# Patient Record
Sex: FEMALE | Race: WHITE | URBAN - METROPOLITAN AREA
[De-identification: names, ages, dates, MRNs, and addresses within clinical notes are randomized per-mention and may not be internally consistent; named-entity substitution may affect disease eponyms.]

---

## 2017-06-19 ENCOUNTER — OUTPATIENT (OUTPATIENT)
Dept: OUTPATIENT SERVICES | Facility: HOSPITAL | Age: 42
LOS: 1 days | End: 2017-06-19
Payer: COMMERCIAL

## 2017-06-19 DIAGNOSIS — O26.899 OTHER SPECIFIED PREGNANCY RELATED CONDITIONS, UNSPECIFIED TRIMESTER: ICD-10-CM

## 2017-06-19 DIAGNOSIS — Z3A.00 WEEKS OF GESTATION OF PREGNANCY NOT SPECIFIED: ICD-10-CM

## 2017-06-19 PROCEDURE — 99214 OFFICE O/P EST MOD 30 MIN: CPT

## 2017-06-21 DIAGNOSIS — O09.513 SUPERVISION OF ELDERLY PRIMIGRAVIDA, THIRD TRIMESTER: ICD-10-CM

## 2017-06-22 ENCOUNTER — OUTPATIENT (OUTPATIENT)
Dept: OUTPATIENT SERVICES | Facility: HOSPITAL | Age: 42
LOS: 1 days | End: 2017-06-22
Payer: COMMERCIAL

## 2017-06-22 DIAGNOSIS — Z01.818 ENCOUNTER FOR OTHER PREPROCEDURAL EXAMINATION: ICD-10-CM

## 2017-06-22 LAB
ANION GAP SERPL CALC-SCNC: 13 MMOL/L — SIGNIFICANT CHANGE UP (ref 5–17)
APTT BLD: 26.1 SEC — LOW (ref 27.5–37.4)
BLD GP AB SCN SERPL QL: NEGATIVE — SIGNIFICANT CHANGE UP
BLD GP AB SCN SERPL QL: NEGATIVE — SIGNIFICANT CHANGE UP
BUN SERPL-MCNC: 11 MG/DL — SIGNIFICANT CHANGE UP (ref 7–23)
CALCIUM SERPL-MCNC: 9.3 MG/DL — SIGNIFICANT CHANGE UP (ref 8.4–10.5)
CHLORIDE SERPL-SCNC: 100 MMOL/L — SIGNIFICANT CHANGE UP (ref 96–108)
CO2 SERPL-SCNC: 23 MMOL/L — SIGNIFICANT CHANGE UP (ref 22–31)
CREAT SERPL-MCNC: 0.6 MG/DL — SIGNIFICANT CHANGE UP (ref 0.5–1.3)
GLUCOSE SERPL-MCNC: 183 MG/DL — HIGH (ref 70–99)
HCT VFR BLD CALC: 38.9 % — SIGNIFICANT CHANGE UP (ref 34.5–45)
HGB BLD-MCNC: 13.1 G/DL — SIGNIFICANT CHANGE UP (ref 11.5–15.5)
INR BLD: 0.85 — LOW (ref 0.88–1.16)
MCHC RBC-ENTMCNC: 31.3 PG — SIGNIFICANT CHANGE UP (ref 27–34)
MCHC RBC-ENTMCNC: 33.7 G/DL — SIGNIFICANT CHANGE UP (ref 32–36)
MCV RBC AUTO: 93.1 FL — SIGNIFICANT CHANGE UP (ref 80–100)
PLATELET # BLD AUTO: 284 K/UL — SIGNIFICANT CHANGE UP (ref 150–400)
POTASSIUM SERPL-MCNC: 5 MMOL/L — SIGNIFICANT CHANGE UP (ref 3.5–5.3)
POTASSIUM SERPL-SCNC: 5 MMOL/L — SIGNIFICANT CHANGE UP (ref 3.5–5.3)
PROTHROM AB SERPL-ACNC: 9.4 SEC — LOW (ref 9.8–12.7)
RBC # BLD: 4.18 M/UL — SIGNIFICANT CHANGE UP (ref 3.8–5.2)
RBC # FLD: 13.5 % — SIGNIFICANT CHANGE UP (ref 10.3–16.9)
RH IG SCN BLD-IMP: POSITIVE — SIGNIFICANT CHANGE UP
RH IG SCN BLD-IMP: POSITIVE — SIGNIFICANT CHANGE UP
SODIUM SERPL-SCNC: 136 MMOL/L — SIGNIFICANT CHANGE UP (ref 135–145)
WBC # BLD: 14.4 K/UL — HIGH (ref 3.8–10.5)
WBC # FLD AUTO: 14.4 K/UL — HIGH (ref 3.8–10.5)

## 2017-06-22 PROCEDURE — 80048 BASIC METABOLIC PNL TOTAL CA: CPT

## 2017-06-22 PROCEDURE — 85730 THROMBOPLASTIN TIME PARTIAL: CPT

## 2017-06-22 PROCEDURE — 85027 COMPLETE CBC AUTOMATED: CPT

## 2017-06-22 PROCEDURE — 85610 PROTHROMBIN TIME: CPT

## 2017-06-22 PROCEDURE — 86901 BLOOD TYPING SEROLOGIC RH(D): CPT

## 2017-06-22 PROCEDURE — 86900 BLOOD TYPING SEROLOGIC ABO: CPT

## 2017-06-22 PROCEDURE — 86850 RBC ANTIBODY SCREEN: CPT

## 2017-06-27 ENCOUNTER — OUTPATIENT (OUTPATIENT)
Dept: OUTPATIENT SERVICES | Facility: HOSPITAL | Age: 42
LOS: 1 days | End: 2017-06-27
Payer: COMMERCIAL

## 2017-06-27 DIAGNOSIS — O26.899 OTHER SPECIFIED PREGNANCY RELATED CONDITIONS, UNSPECIFIED TRIMESTER: ICD-10-CM

## 2017-06-27 DIAGNOSIS — Z3A.00 WEEKS OF GESTATION OF PREGNANCY NOT SPECIFIED: ICD-10-CM

## 2017-06-27 PROCEDURE — 99214 OFFICE O/P EST MOD 30 MIN: CPT

## 2017-06-28 DIAGNOSIS — O09.513 SUPERVISION OF ELDERLY PRIMIGRAVIDA, THIRD TRIMESTER: ICD-10-CM

## 2017-06-29 ENCOUNTER — INPATIENT (INPATIENT)
Facility: HOSPITAL | Age: 42
LOS: 3 days | Discharge: HOME | End: 2017-07-03
Attending: SPECIALIST | Admitting: SPECIALIST
Payer: COMMERCIAL

## 2017-06-29 ENCOUNTER — RESULT REVIEW (OUTPATIENT)
Age: 42
End: 2017-06-29

## 2017-06-29 VITALS — HEIGHT: 64 IN | WEIGHT: 225.97 LBS

## 2017-06-29 LAB
BLD GP AB SCN SERPL QL: NEGATIVE — SIGNIFICANT CHANGE UP
RH IG SCN BLD-IMP: POSITIVE — SIGNIFICANT CHANGE UP
T PALLIDUM AB TITR SER: NEGATIVE — SIGNIFICANT CHANGE UP

## 2017-06-29 RX ORDER — OXYTOCIN 10 UNIT/ML
333.33 VIAL (ML) INJECTION
Qty: 20 | Refills: 0 | Status: DISCONTINUED | OUTPATIENT
Start: 2017-06-29 | End: 2017-07-03

## 2017-06-29 RX ORDER — METOCLOPRAMIDE HCL 10 MG
10 TABLET ORAL ONCE
Qty: 0 | Refills: 0 | Status: DISCONTINUED | OUTPATIENT
Start: 2017-06-29 | End: 2017-06-29

## 2017-06-29 RX ORDER — OXYTOCIN 10 UNIT/ML
41.67 VIAL (ML) INJECTION
Qty: 20 | Refills: 0 | Status: DISCONTINUED | OUTPATIENT
Start: 2017-06-29 | End: 2017-06-29

## 2017-06-29 RX ORDER — CEFAZOLIN SODIUM 1 G
2000 VIAL (EA) INJECTION ONCE
Qty: 0 | Refills: 0 | Status: COMPLETED | OUTPATIENT
Start: 2017-06-29 | End: 2017-06-29

## 2017-06-29 RX ORDER — DIPHENHYDRAMINE HCL 50 MG
25 CAPSULE ORAL EVERY 6 HOURS
Qty: 0 | Refills: 0 | Status: DISCONTINUED | OUTPATIENT
Start: 2017-06-29 | End: 2017-07-03

## 2017-06-29 RX ORDER — FERROUS SULFATE 325(65) MG
325 TABLET ORAL DAILY
Qty: 0 | Refills: 0 | Status: DISCONTINUED | OUTPATIENT
Start: 2017-06-29 | End: 2017-07-03

## 2017-06-29 RX ORDER — NALOXONE HYDROCHLORIDE 4 MG/.1ML
0.1 SPRAY NASAL
Qty: 0 | Refills: 0 | Status: DISCONTINUED | OUTPATIENT
Start: 2017-06-29 | End: 2017-06-29

## 2017-06-29 RX ORDER — ACETAMINOPHEN 500 MG
650 TABLET ORAL EVERY 6 HOURS
Qty: 0 | Refills: 0 | Status: DISCONTINUED | OUTPATIENT
Start: 2017-06-29 | End: 2017-07-03

## 2017-06-29 RX ORDER — IBUPROFEN 200 MG
600 TABLET ORAL EVERY 6 HOURS
Qty: 0 | Refills: 0 | Status: DISCONTINUED | OUTPATIENT
Start: 2017-06-29 | End: 2017-07-03

## 2017-06-29 RX ORDER — ONDANSETRON 8 MG/1
4 TABLET, FILM COATED ORAL EVERY 6 HOURS
Qty: 0 | Refills: 0 | Status: DISCONTINUED | OUTPATIENT
Start: 2017-06-29 | End: 2017-06-29

## 2017-06-29 RX ORDER — MORPHINE SULFATE 50 MG/1
0.3 CAPSULE, EXTENDED RELEASE ORAL ONCE
Qty: 0 | Refills: 0 | Status: DISCONTINUED | OUTPATIENT
Start: 2017-06-29 | End: 2017-06-29

## 2017-06-29 RX ORDER — LEVOTHYROXINE SODIUM 125 MCG
175 TABLET ORAL DAILY
Qty: 0 | Refills: 0 | Status: DISCONTINUED | OUTPATIENT
Start: 2017-06-30 | End: 2017-07-03

## 2017-06-29 RX ORDER — SIMETHICONE 80 MG/1
80 TABLET, CHEWABLE ORAL EVERY 4 HOURS
Qty: 0 | Refills: 0 | Status: DISCONTINUED | OUTPATIENT
Start: 2017-06-29 | End: 2017-07-03

## 2017-06-29 RX ORDER — HEPARIN SODIUM 5000 [USP'U]/ML
5000 INJECTION INTRAVENOUS; SUBCUTANEOUS EVERY 12 HOURS
Qty: 0 | Refills: 0 | Status: DISCONTINUED | OUTPATIENT
Start: 2017-06-30 | End: 2017-07-03

## 2017-06-29 RX ORDER — OXYTOCIN 10 UNIT/ML
41.67 VIAL (ML) INJECTION
Qty: 20 | Refills: 0 | Status: DISCONTINUED | OUTPATIENT
Start: 2017-06-29 | End: 2017-07-03

## 2017-06-29 RX ORDER — SODIUM CHLORIDE 9 MG/ML
1000 INJECTION, SOLUTION INTRAVENOUS ONCE
Qty: 0 | Refills: 0 | Status: COMPLETED | OUTPATIENT
Start: 2017-06-29 | End: 2017-06-29

## 2017-06-29 RX ORDER — CITRIC ACID/SODIUM CITRATE 300-500 MG
30 SOLUTION, ORAL ORAL ONCE
Qty: 0 | Refills: 0 | Status: COMPLETED | OUTPATIENT
Start: 2017-06-29 | End: 2017-06-29

## 2017-06-29 RX ORDER — SODIUM CHLORIDE 9 MG/ML
1000 INJECTION, SOLUTION INTRAVENOUS
Qty: 0 | Refills: 0 | Status: DISCONTINUED | OUTPATIENT
Start: 2017-06-29 | End: 2017-06-29

## 2017-06-29 RX ORDER — TETANUS TOXOID, REDUCED DIPHTHERIA TOXOID AND ACELLULAR PERTUSSIS VACCINE, ADSORBED 5; 2.5; 8; 8; 2.5 [IU]/.5ML; [IU]/.5ML; UG/.5ML; UG/.5ML; UG/.5ML
0.5 SUSPENSION INTRAMUSCULAR ONCE
Qty: 0 | Refills: 0 | Status: DISCONTINUED | OUTPATIENT
Start: 2017-06-29 | End: 2017-07-03

## 2017-06-29 RX ORDER — IBUPROFEN 200 MG
600 TABLET ORAL ONCE
Qty: 0 | Refills: 0 | Status: DISCONTINUED | OUTPATIENT
Start: 2017-06-29 | End: 2017-06-29

## 2017-06-29 RX ORDER — SODIUM CHLORIDE 9 MG/ML
1000 INJECTION, SOLUTION INTRAVENOUS
Qty: 0 | Refills: 0 | Status: DISCONTINUED | OUTPATIENT
Start: 2017-06-30 | End: 2017-07-03

## 2017-06-29 RX ORDER — LANOLIN
1 OINTMENT (GRAM) TOPICAL
Qty: 0 | Refills: 0 | Status: DISCONTINUED | OUTPATIENT
Start: 2017-06-29 | End: 2017-07-03

## 2017-06-29 RX ORDER — DOCUSATE SODIUM 100 MG
100 CAPSULE ORAL
Qty: 0 | Refills: 0 | Status: DISCONTINUED | OUTPATIENT
Start: 2017-06-29 | End: 2017-07-03

## 2017-06-29 RX ORDER — GLYCERIN ADULT
1 SUPPOSITORY, RECTAL RECTAL AT BEDTIME
Qty: 0 | Refills: 0 | Status: DISCONTINUED | OUTPATIENT
Start: 2017-06-29 | End: 2017-07-03

## 2017-06-29 RX ORDER — SODIUM CHLORIDE 9 MG/ML
1000 INJECTION, SOLUTION INTRAVENOUS
Qty: 0 | Refills: 0 | Status: DISCONTINUED | OUTPATIENT
Start: 2017-06-29 | End: 2017-06-30

## 2017-06-29 RX ADMIN — Medication 30 MILLILITER(S): at 13:12

## 2017-06-29 RX ADMIN — Medication 100 MILLIGRAM(S): at 13:13

## 2017-06-29 RX ADMIN — Medication 125 MILLIUNIT(S)/MIN: at 15:23

## 2017-06-29 RX ADMIN — SODIUM CHLORIDE 125 MILLILITER(S): 9 INJECTION, SOLUTION INTRAVENOUS at 10:45

## 2017-06-29 RX ADMIN — Medication 1000 MILLIUNIT(S)/MIN: at 15:22

## 2017-06-29 RX ADMIN — Medication 125 MILLIUNIT(S)/MIN: at 15:22

## 2017-06-29 RX ADMIN — SODIUM CHLORIDE 2000 MILLILITER(S): 9 INJECTION, SOLUTION INTRAVENOUS at 10:20

## 2017-06-29 RX ADMIN — Medication 202 MILLIGRAM(S): at 17:10

## 2017-06-30 LAB
HCT VFR BLD CALC: 32.6 % — LOW (ref 34.5–45)
HGB BLD-MCNC: 11 G/DL — LOW (ref 11.5–15.5)
MCHC RBC-ENTMCNC: 31.1 PG — SIGNIFICANT CHANGE UP (ref 27–34)
MCHC RBC-ENTMCNC: 33.7 G/DL — SIGNIFICANT CHANGE UP (ref 32–36)
MCV RBC AUTO: 92.1 FL — SIGNIFICANT CHANGE UP (ref 80–100)
PLATELET # BLD AUTO: 247 K/UL — SIGNIFICANT CHANGE UP (ref 150–400)
RBC # BLD: 3.54 M/UL — LOW (ref 3.8–5.2)
RBC # FLD: 13.6 % — SIGNIFICANT CHANGE UP (ref 10.3–16.9)
WBC # BLD: 13.4 K/UL — HIGH (ref 3.8–10.5)
WBC # FLD AUTO: 13.4 K/UL — HIGH (ref 3.8–10.5)

## 2017-06-30 RX ADMIN — Medication 600 MILLIGRAM(S): at 06:30

## 2017-06-30 RX ADMIN — Medication 600 MILLIGRAM(S): at 18:36

## 2017-06-30 RX ADMIN — HEPARIN SODIUM 5000 UNIT(S): 5000 INJECTION INTRAVENOUS; SUBCUTANEOUS at 22:59

## 2017-06-30 RX ADMIN — Medication 325 MILLIGRAM(S): at 12:38

## 2017-06-30 RX ADMIN — Medication 100 MILLIGRAM(S): at 07:00

## 2017-06-30 RX ADMIN — Medication 600 MILLIGRAM(S): at 12:38

## 2017-06-30 RX ADMIN — Medication 1 TABLET(S): at 12:38

## 2017-06-30 RX ADMIN — Medication 650 MILLIGRAM(S): at 02:30

## 2017-06-30 RX ADMIN — Medication 600 MILLIGRAM(S): at 07:19

## 2017-06-30 RX ADMIN — Medication 100 MILLIGRAM(S): at 18:39

## 2017-06-30 RX ADMIN — SIMETHICONE 80 MILLIGRAM(S): 80 TABLET, CHEWABLE ORAL at 07:00

## 2017-06-30 NOTE — PROGRESS NOTE ADULT - ASSESSMENT
A/P  yo 42y s/p c/s, POD # 1 ,stable  Diet: Reg  Pain: OPM  IV fluid: Saline lock  OOB/SCDs/IS  Continue to monitor  Anticipate discharge POD #3 or #4

## 2017-06-30 NOTE — PROGRESS NOTE ADULT - SUBJECTIVE AND OBJECTIVE BOX
Patient evaluated at bedside.   She reports pain is well controlled   She denies headache, dizziness, chest pain, palpitations, shortness of breathe, nausea, vomiting or heavy vaginal bleeding.  She has not been ambulating  or voiding spontaneously with SCDs on and poe in. She has not passed gas but is tolerating clear diet and is breastfeeding.    Physical Exam:  Vital Signs Last 24 Hrs  T(C): 36.7 (30 Jun 2017 02:00), Max: 36.7 (30 Jun 2017 02:00)  T(F): 98.1 (30 Jun 2017 02:00), Max: 98.1 (30 Jun 2017 02:00)  HR: 70 (30 Jun 2017 02:00) (58 - 77)  BP: 93/58 (30 Jun 2017 02:00) (93/58 - 125/59)  BP(mean): --  RR: 18 (30 Jun 2017 02:00) (16 - 20)  SpO2: 98% (30 Jun 2017 02:00) (95% - 100%)    GA: NAD, A+0 x 3  CV: RRR  Pulm: CTAB  Breasts: soft, nontender, no palpable masses  Abd: + BS, soft, nontender, nondistended, no rebound or guarding, pressure dressing clean, dry and intact, uterus firm at midline at umbilicus  : lochia WNL  Poe: insitu, clear  Extremities: no swelling or calf tenderness, reflexes +2 bilaterally

## 2017-07-01 RX ORDER — IBUPROFEN 200 MG
600 TABLET ORAL EVERY 6 HOURS
Qty: 0 | Refills: 0 | Status: DISCONTINUED | OUTPATIENT
Start: 2017-07-01 | End: 2017-07-03

## 2017-07-01 RX ADMIN — Medication 100 MILLIGRAM(S): at 07:06

## 2017-07-01 RX ADMIN — Medication 600 MILLIGRAM(S): at 08:08

## 2017-07-01 RX ADMIN — Medication 1 TABLET(S): at 12:34

## 2017-07-01 RX ADMIN — Medication 600 MILLIGRAM(S): at 13:15

## 2017-07-01 RX ADMIN — Medication 600 MILLIGRAM(S): at 17:20

## 2017-07-01 RX ADMIN — Medication 600 MILLIGRAM(S): at 18:39

## 2017-07-01 RX ADMIN — Medication 175 MICROGRAM(S): at 07:03

## 2017-07-01 RX ADMIN — Medication 100 MILLIGRAM(S): at 12:32

## 2017-07-01 RX ADMIN — Medication 600 MILLIGRAM(S): at 07:20

## 2017-07-01 RX ADMIN — Medication 600 MILLIGRAM(S): at 12:29

## 2017-07-01 RX ADMIN — HEPARIN SODIUM 5000 UNIT(S): 5000 INJECTION INTRAVENOUS; SUBCUTANEOUS at 12:33

## 2017-07-01 RX ADMIN — Medication 325 MILLIGRAM(S): at 12:32

## 2017-07-01 NOTE — PROGRESS NOTE ADULT - ASSESSMENT
A/P  42y  s/p , POD #2, stable  - Pain: Motrin or Percocet prn  - GI: Reg diet  - : Voiding  - DVT prophylaxis: SQH 5000u q12  - Dispo: POD3 or 4

## 2017-07-01 NOTE — PROGRESS NOTE ADULT - SUBJECTIVE AND OBJECTIVE BOX
Patient evaluated at bedside.   She reports pain is well controlled.  She denies headache, dizziness, chest pain, palpitations, shortness of breathe, nausea, vomiting or heavy vaginal bleeding.  She has been ambulating without assistance, voiding spontaneously, passing gas, tolerating regular diet and is breastfeeding.    Physical Exam:  Vital Signs Last 24 Hrs  T(C): 36.6 (01 Jul 2017 06:22), Max: 37 (30 Jun 2017 14:00)  T(F): 97.9 (01 Jul 2017 06:22), Max: 98.6 (30 Jun 2017 14:00)  HR: 73 (01 Jul 2017 06:22) (72 - 82)  BP: 105/73 (01 Jul 2017 06:22) (88/57 - 105/73)  BP(mean): --  RR: 18 (01 Jul 2017 06:22) (18 - 18)  SpO2: 96% (01 Jul 2017 06:22) (96% - 100%)    06-30 @ 07:01  -  07-01 @ 07:00  --------------------------------------------------------  IN: 0 mL / OUT: 1000 mL / NET: -1000 mL        GA: NAD, A+0 x 3  CV: RRR  Pulm: Normal work of breathing  Breasts: soft, nontender, no palpable masses  Abd: + BS, soft, nontender, nondistended, no rebound or guarding, uterus firm at midline, 3 fb below umbilicus  Incision: well approximated, no erythema or discharge  : lochia WNL  Extremities: no swelling or calf tenderness                            11.0   13.4  )-----------( 247      ( 30 Jun 2017 11:03 )             32.6

## 2017-07-02 RX ADMIN — HEPARIN SODIUM 5000 UNIT(S): 5000 INJECTION INTRAVENOUS; SUBCUTANEOUS at 00:11

## 2017-07-02 RX ADMIN — Medication 600 MILLIGRAM(S): at 18:09

## 2017-07-02 RX ADMIN — Medication 600 MILLIGRAM(S): at 00:11

## 2017-07-02 RX ADMIN — Medication 600 MILLIGRAM(S): at 06:26

## 2017-07-02 RX ADMIN — Medication 200 MILLIGRAM(S): at 12:34

## 2017-07-02 RX ADMIN — HEPARIN SODIUM 5000 UNIT(S): 5000 INJECTION INTRAVENOUS; SUBCUTANEOUS at 14:47

## 2017-07-02 RX ADMIN — Medication 100 MILLIGRAM(S): at 07:58

## 2017-07-02 RX ADMIN — Medication 175 MICROGRAM(S): at 06:29

## 2017-07-02 RX ADMIN — Medication 1 TABLET(S): at 12:59

## 2017-07-02 RX ADMIN — Medication 600 MILLIGRAM(S): at 18:24

## 2017-07-02 RX ADMIN — Medication 600 MILLIGRAM(S): at 07:15

## 2017-07-02 RX ADMIN — Medication 325 MILLIGRAM(S): at 12:58

## 2017-07-02 RX ADMIN — Medication 600 MILLIGRAM(S): at 01:05

## 2017-07-02 NOTE — PROGRESS NOTE ADULT - SUBJECTIVE AND OBJECTIVE BOX
Patient evaluated at bedside.   She reports pain is well controlled.  She denies heavy vaginal bleeding.  She has been ambulating without assistance, voiding spontaneously, passing gas, tolerating regular diet and is breastfeeding.    Physical Exam:  Vital Signs Last 24 Hrs  T(C): 36.3 (01 Jul 2017 22:04), Max: 36.6 (01 Jul 2017 06:22)  T(F): 97.4 (01 Jul 2017 22:04), Max: 97.9 (01 Jul 2017 06:22)  HR: 73 (01 Jul 2017 22:04) (73 - 80)  BP: 101/50 (01 Jul 2017 22:04) (100/53 - 105/73)  BP(mean): --  RR: 18 (01 Jul 2017 22:04) (18 - 18)  SpO2: 98% (01 Jul 2017 22:04) (96% - 98%)    GA: NAD, A+0 x 3  Abd: soft, nontender, nondistended, no rebound or guarding, incision clean, dry and intact, uterus firm at midline,  below the umbilicus  : lochia WNL  Extremities: no calf tenderness                            11.0   13.4  )-----------( 247      ( 30 Jun 2017 11:03 )             32.6

## 2017-07-02 NOTE — PROGRESS NOTE ADULT - ASSESSMENT
A/P  41yo  s/p c/s, POD #3  ,stable  1. Pain: well controlled on PO pain meds  2. GI: regular diet  3. : voiding  4. DVT prophylaxis: SQH, ambulation  5. Dispo: POD 3 or 4

## 2017-07-03 ENCOUNTER — TRANSCRIPTION ENCOUNTER (OUTPATIENT)
Age: 42
End: 2017-07-03

## 2017-07-03 VITALS
HEART RATE: 81 BPM | RESPIRATION RATE: 17 BRPM | TEMPERATURE: 98 F | SYSTOLIC BLOOD PRESSURE: 101 MMHG | OXYGEN SATURATION: 99 % | DIASTOLIC BLOOD PRESSURE: 69 MMHG

## 2017-07-03 PROCEDURE — 86780 TREPONEMA PALLIDUM: CPT

## 2017-07-03 PROCEDURE — 86900 BLOOD TYPING SEROLOGIC ABO: CPT

## 2017-07-03 PROCEDURE — 85027 COMPLETE CBC AUTOMATED: CPT

## 2017-07-03 PROCEDURE — 86850 RBC ANTIBODY SCREEN: CPT

## 2017-07-03 PROCEDURE — 36415 COLL VENOUS BLD VENIPUNCTURE: CPT

## 2017-07-03 PROCEDURE — 86901 BLOOD TYPING SEROLOGIC RH(D): CPT

## 2017-07-03 PROCEDURE — 88307 TISSUE EXAM BY PATHOLOGIST: CPT

## 2017-07-03 RX ORDER — LEVOTHYROXINE SODIUM 125 MCG
1 TABLET ORAL
Qty: 0 | Refills: 0 | COMMUNITY

## 2017-07-03 RX ADMIN — Medication 175 MICROGRAM(S): at 06:10

## 2017-07-03 RX ADMIN — Medication 1 TABLET(S): at 12:32

## 2017-07-03 RX ADMIN — Medication 600 MILLIGRAM(S): at 13:03

## 2017-07-03 RX ADMIN — Medication 600 MILLIGRAM(S): at 07:00

## 2017-07-03 RX ADMIN — Medication 600 MILLIGRAM(S): at 00:07

## 2017-07-03 RX ADMIN — Medication 600 MILLIGRAM(S): at 12:32

## 2017-07-03 RX ADMIN — HEPARIN SODIUM 5000 UNIT(S): 5000 INJECTION INTRAVENOUS; SUBCUTANEOUS at 00:07

## 2017-07-03 RX ADMIN — Medication 600 MILLIGRAM(S): at 06:10

## 2017-07-03 RX ADMIN — Medication 600 MILLIGRAM(S): at 01:00

## 2017-07-03 RX ADMIN — Medication 325 MILLIGRAM(S): at 12:32

## 2017-07-03 NOTE — DISCHARGE NOTE OB - PLAN OF CARE
Uncomplicated postpartum recovery. Follow up with Dr. Peña in 2 weeks. Nothing per vagina x2 weeks.  Showers only.  Normal activity, restrict heavy lifting and strenuous activity.  Call or go to emergency room if T >101 F, extreme onset pain or bleeding or foul smelling vaginal discharge.

## 2017-07-03 NOTE — DISCHARGE NOTE OB - CARE PROVIDER_API CALL
Chris Peña), Obstetrics and Gynecology  [address] [place] [id number]  Phone: [phone]  Fax: [phone]

## 2017-07-03 NOTE — DISCHARGE NOTE OB - CARE PLAN
Principal Discharge DX:	 delivery delivered  Goal:	Uncomplicated postpartum recovery.  Instructions for follow-up, activity and diet:	Follow up with Dr. Peña in 2 weeks. Nothing per vagina x2 weeks.  Showers only.  Normal activity, restrict heavy lifting and strenuous activity.  Call or go to emergency room if T >101 F, extreme onset pain or bleeding or foul smelling vaginal discharge.

## 2017-07-06 DIAGNOSIS — E03.9 HYPOTHYROIDISM, UNSPECIFIED: ICD-10-CM

## 2017-07-06 DIAGNOSIS — O09.513 SUPERVISION OF ELDERLY PRIMIGRAVIDA, THIRD TRIMESTER: ICD-10-CM

## 2017-07-06 DIAGNOSIS — O36.5930 MATERNAL CARE FOR OTHER KNOWN OR SUSPECTED POOR FETAL GROWTH, THIRD TRIMESTER, NOT APPLICABLE OR UNSPECIFIED: ICD-10-CM

## 2017-07-06 DIAGNOSIS — Z34.03 ENCOUNTER FOR SUPERVISION OF NORMAL FIRST PREGNANCY, THIRD TRIMESTER: ICD-10-CM

## 2017-07-06 DIAGNOSIS — Z98.84 BARIATRIC SURGERY STATUS: ICD-10-CM

## 2017-07-06 DIAGNOSIS — Z3A.37 37 WEEKS GESTATION OF PREGNANCY: ICD-10-CM

## 2017-07-06 LAB — SURGICAL PATHOLOGY STUDY: SIGNIFICANT CHANGE UP

## 2018-05-09 NOTE — DISCHARGE NOTE OB - USE THE 5 A'S (ASK, ADVISE, ASSESS, ASSIST, ARRANGE)
Statement Selected
monday wed, friday  pt did not provide info regarding renal Dr and last HD day.  renal diet

## 2018-06-07 ENCOUNTER — OUTPATIENT (OUTPATIENT)
Dept: OUTPATIENT SERVICES | Facility: HOSPITAL | Age: 43
LOS: 1 days | End: 2018-06-07
Payer: COMMERCIAL

## 2018-06-07 DIAGNOSIS — Z3A.00 WEEKS OF GESTATION OF PREGNANCY NOT SPECIFIED: ICD-10-CM

## 2018-06-07 DIAGNOSIS — O26.899 OTHER SPECIFIED PREGNANCY RELATED CONDITIONS, UNSPECIFIED TRIMESTER: ICD-10-CM

## 2018-06-07 PROCEDURE — 99214 OFFICE O/P EST MOD 30 MIN: CPT

## 2018-06-07 PROCEDURE — 76816 OB US FOLLOW-UP PER FETUS: CPT

## 2018-06-07 PROCEDURE — 76817 TRANSVAGINAL US OBSTETRIC: CPT

## 2018-06-08 DIAGNOSIS — O09.522 SUPERVISION OF ELDERLY MULTIGRAVIDA, SECOND TRIMESTER: ICD-10-CM

## 2018-06-20 ENCOUNTER — TRANSCRIPTION ENCOUNTER (OUTPATIENT)
Age: 43
End: 2018-06-20

## 2018-09-24 ENCOUNTER — OUTPATIENT (OUTPATIENT)
Dept: OUTPATIENT SERVICES | Facility: HOSPITAL | Age: 43
LOS: 1 days | End: 2018-09-24
Payer: COMMERCIAL

## 2018-09-24 DIAGNOSIS — Z01.818 ENCOUNTER FOR OTHER PREPROCEDURAL EXAMINATION: ICD-10-CM

## 2018-09-24 LAB
ANION GAP SERPL CALC-SCNC: 13 MMOL/L — SIGNIFICANT CHANGE UP (ref 5–17)
APTT BLD: 26.5 SEC — LOW (ref 27.5–37.4)
BLD GP AB SCN SERPL QL: NEGATIVE — SIGNIFICANT CHANGE UP
BUN SERPL-MCNC: 10 MG/DL — SIGNIFICANT CHANGE UP (ref 7–23)
CALCIUM SERPL-MCNC: 9.3 MG/DL — SIGNIFICANT CHANGE UP (ref 8.4–10.5)
CHLORIDE SERPL-SCNC: 102 MMOL/L — SIGNIFICANT CHANGE UP (ref 96–108)
CO2 SERPL-SCNC: 23 MMOL/L — SIGNIFICANT CHANGE UP (ref 22–31)
CREAT SERPL-MCNC: 0.59 MG/DL — SIGNIFICANT CHANGE UP (ref 0.5–1.3)
GLUCOSE SERPL-MCNC: 157 MG/DL — HIGH (ref 70–99)
HCT VFR BLD CALC: 37.6 % — SIGNIFICANT CHANGE UP (ref 34.5–45)
HGB BLD-MCNC: 11.9 G/DL — SIGNIFICANT CHANGE UP (ref 11.5–15.5)
INR BLD: 0.88 — SIGNIFICANT CHANGE UP (ref 0.88–1.16)
MCHC RBC-ENTMCNC: 29.4 PG — SIGNIFICANT CHANGE UP (ref 27–34)
MCHC RBC-ENTMCNC: 31.6 G/DL — LOW (ref 32–36)
MCV RBC AUTO: 92.8 FL — SIGNIFICANT CHANGE UP (ref 80–100)
PLATELET # BLD AUTO: 273 K/UL — SIGNIFICANT CHANGE UP (ref 150–400)
POTASSIUM SERPL-MCNC: 5.4 MMOL/L — HIGH (ref 3.5–5.3)
POTASSIUM SERPL-SCNC: 5.4 MMOL/L — HIGH (ref 3.5–5.3)
PROTHROM AB SERPL-ACNC: 9.7 SEC — LOW (ref 9.8–12.7)
RBC # BLD: 4.05 M/UL — SIGNIFICANT CHANGE UP (ref 3.8–5.2)
RBC # FLD: 14.3 % — SIGNIFICANT CHANGE UP (ref 10.3–16.9)
RH IG SCN BLD-IMP: POSITIVE — SIGNIFICANT CHANGE UP
SODIUM SERPL-SCNC: 138 MMOL/L — SIGNIFICANT CHANGE UP (ref 135–145)
WBC # BLD: 10.5 K/UL — SIGNIFICANT CHANGE UP (ref 3.8–10.5)
WBC # FLD AUTO: 10.5 K/UL — SIGNIFICANT CHANGE UP (ref 3.8–10.5)

## 2018-09-24 PROCEDURE — 85027 COMPLETE CBC AUTOMATED: CPT

## 2018-09-24 PROCEDURE — 85610 PROTHROMBIN TIME: CPT

## 2018-09-24 PROCEDURE — 86901 BLOOD TYPING SEROLOGIC RH(D): CPT

## 2018-09-24 PROCEDURE — 80048 BASIC METABOLIC PNL TOTAL CA: CPT

## 2018-09-24 PROCEDURE — 86850 RBC ANTIBODY SCREEN: CPT

## 2018-09-24 PROCEDURE — 85730 THROMBOPLASTIN TIME PARTIAL: CPT

## 2018-09-24 PROCEDURE — 86900 BLOOD TYPING SEROLOGIC ABO: CPT

## 2018-10-02 ENCOUNTER — INPATIENT (INPATIENT)
Facility: HOSPITAL | Age: 43
LOS: 2 days | Discharge: HOME | End: 2018-10-05
Attending: SPECIALIST | Admitting: SPECIALIST
Payer: COMMERCIAL

## 2018-10-02 VITALS — HEIGHT: 64 IN | WEIGHT: 222.67 LBS

## 2018-10-02 LAB
BLD GP AB SCN SERPL QL: NEGATIVE — SIGNIFICANT CHANGE UP
HCT VFR BLD CALC: 35 % — SIGNIFICANT CHANGE UP (ref 34.5–45)
HGB BLD-MCNC: 11.1 G/DL — LOW (ref 11.5–15.5)
MCHC RBC-ENTMCNC: 28.7 PG — SIGNIFICANT CHANGE UP (ref 27–34)
MCHC RBC-ENTMCNC: 31.7 G/DL — LOW (ref 32–36)
MCV RBC AUTO: 90.4 FL — SIGNIFICANT CHANGE UP (ref 80–100)
PLATELET # BLD AUTO: 259 K/UL — SIGNIFICANT CHANGE UP (ref 150–400)
RBC # BLD: 3.87 M/UL — SIGNIFICANT CHANGE UP (ref 3.8–5.2)
RBC # FLD: 14.7 % — SIGNIFICANT CHANGE UP (ref 10.3–16.9)
RH IG SCN BLD-IMP: POSITIVE — SIGNIFICANT CHANGE UP
WBC # BLD: 9.8 K/UL — SIGNIFICANT CHANGE UP (ref 3.8–10.5)
WBC # FLD AUTO: 9.8 K/UL — SIGNIFICANT CHANGE UP (ref 3.8–10.5)

## 2018-10-02 RX ORDER — IBUPROFEN 200 MG
600 TABLET ORAL EVERY 6 HOURS
Qty: 0 | Refills: 0 | Status: DISCONTINUED | OUTPATIENT
Start: 2018-10-02 | End: 2018-10-05

## 2018-10-02 RX ORDER — SODIUM CHLORIDE 9 MG/ML
1000 INJECTION, SOLUTION INTRAVENOUS ONCE
Qty: 0 | Refills: 0 | Status: COMPLETED | OUTPATIENT
Start: 2018-10-02 | End: 2018-10-02

## 2018-10-02 RX ORDER — TETANUS TOXOID, REDUCED DIPHTHERIA TOXOID AND ACELLULAR PERTUSSIS VACCINE, ADSORBED 5; 2.5; 8; 8; 2.5 [IU]/.5ML; [IU]/.5ML; UG/.5ML; UG/.5ML; UG/.5ML
0.5 SUSPENSION INTRAMUSCULAR ONCE
Qty: 0 | Refills: 0 | Status: DISCONTINUED | OUTPATIENT
Start: 2018-10-02 | End: 2018-10-05

## 2018-10-02 RX ORDER — OXYCODONE AND ACETAMINOPHEN 5; 325 MG/1; MG/1
2 TABLET ORAL EVERY 6 HOURS
Qty: 0 | Refills: 0 | Status: DISCONTINUED | OUTPATIENT
Start: 2018-10-02 | End: 2018-10-05

## 2018-10-02 RX ORDER — SODIUM CHLORIDE 9 MG/ML
1000 INJECTION, SOLUTION INTRAVENOUS
Qty: 0 | Refills: 0 | Status: DISCONTINUED | OUTPATIENT
Start: 2018-10-02 | End: 2018-10-05

## 2018-10-02 RX ORDER — SIMETHICONE 80 MG/1
80 TABLET, CHEWABLE ORAL EVERY 4 HOURS
Qty: 0 | Refills: 0 | Status: DISCONTINUED | OUTPATIENT
Start: 2018-10-02 | End: 2018-10-05

## 2018-10-02 RX ORDER — CITRIC ACID/SODIUM CITRATE 300-500 MG
30 SOLUTION, ORAL ORAL ONCE
Qty: 0 | Refills: 0 | Status: COMPLETED | OUTPATIENT
Start: 2018-10-02 | End: 2018-10-02

## 2018-10-02 RX ORDER — CEFAZOLIN SODIUM 1 G
2000 VIAL (EA) INJECTION ONCE
Qty: 0 | Refills: 0 | Status: COMPLETED | OUTPATIENT
Start: 2018-10-02 | End: 2018-10-02

## 2018-10-02 RX ORDER — HEPARIN SODIUM 5000 [USP'U]/ML
5000 INJECTION INTRAVENOUS; SUBCUTANEOUS EVERY 12 HOURS
Qty: 0 | Refills: 0 | Status: DISCONTINUED | OUTPATIENT
Start: 2018-10-03 | End: 2018-10-05

## 2018-10-02 RX ORDER — LANOLIN
1 OINTMENT (GRAM) TOPICAL
Qty: 0 | Refills: 0 | Status: DISCONTINUED | OUTPATIENT
Start: 2018-10-02 | End: 2018-10-05

## 2018-10-02 RX ORDER — LEVOTHYROXINE SODIUM 125 MCG
75 TABLET ORAL DAILY
Qty: 0 | Refills: 0 | Status: DISCONTINUED | OUTPATIENT
Start: 2018-10-02 | End: 2018-10-05

## 2018-10-02 RX ORDER — HYDROMORPHONE HYDROCHLORIDE 2 MG/ML
0.5 INJECTION INTRAMUSCULAR; INTRAVENOUS; SUBCUTANEOUS
Qty: 0 | Refills: 0 | Status: DISCONTINUED | OUTPATIENT
Start: 2018-10-02 | End: 2018-10-05

## 2018-10-02 RX ORDER — OXYTOCIN 10 UNIT/ML
41.67 VIAL (ML) INJECTION
Qty: 20 | Refills: 0 | Status: DISCONTINUED | OUTPATIENT
Start: 2018-10-02 | End: 2018-10-05

## 2018-10-02 RX ORDER — DIPHENHYDRAMINE HCL 50 MG
25 CAPSULE ORAL EVERY 6 HOURS
Qty: 0 | Refills: 0 | Status: DISCONTINUED | OUTPATIENT
Start: 2018-10-02 | End: 2018-10-05

## 2018-10-02 RX ORDER — DOCUSATE SODIUM 100 MG
100 CAPSULE ORAL
Qty: 0 | Refills: 0 | Status: DISCONTINUED | OUTPATIENT
Start: 2018-10-02 | End: 2018-10-05

## 2018-10-02 RX ORDER — ACETAMINOPHEN 500 MG
650 TABLET ORAL EVERY 6 HOURS
Qty: 0 | Refills: 0 | Status: DISCONTINUED | OUTPATIENT
Start: 2018-10-02 | End: 2018-10-05

## 2018-10-02 RX ORDER — ONDANSETRON 8 MG/1
4 TABLET, FILM COATED ORAL EVERY 6 HOURS
Qty: 0 | Refills: 0 | Status: DISCONTINUED | OUTPATIENT
Start: 2018-10-02 | End: 2018-10-05

## 2018-10-02 RX ORDER — GLYCERIN ADULT
1 SUPPOSITORY, RECTAL RECTAL AT BEDTIME
Qty: 0 | Refills: 0 | Status: DISCONTINUED | OUTPATIENT
Start: 2018-10-02 | End: 2018-10-05

## 2018-10-02 RX ORDER — SCOPALAMINE 1 MG/3D
1 PATCH, EXTENDED RELEASE TRANSDERMAL ONCE
Qty: 0 | Refills: 0 | Status: COMPLETED | OUTPATIENT
Start: 2018-10-02 | End: 2018-10-02

## 2018-10-02 RX ORDER — SODIUM CHLORIDE 9 MG/ML
1000 INJECTION, SOLUTION INTRAVENOUS
Qty: 0 | Refills: 0 | Status: DISCONTINUED | OUTPATIENT
Start: 2018-10-02 | End: 2018-10-02

## 2018-10-02 RX ORDER — MORPHINE SULFATE 50 MG/1
0.3 CAPSULE, EXTENDED RELEASE ORAL ONCE
Qty: 0 | Refills: 0 | Status: DISCONTINUED | OUTPATIENT
Start: 2018-10-02 | End: 2018-10-05

## 2018-10-02 RX ORDER — LEVOTHYROXINE SODIUM 125 MCG
1 TABLET ORAL
Qty: 0 | Refills: 0 | COMMUNITY

## 2018-10-02 RX ORDER — KETOROLAC TROMETHAMINE 30 MG/ML
15 SYRINGE (ML) INJECTION EVERY 6 HOURS
Qty: 0 | Refills: 0 | Status: DISCONTINUED | OUTPATIENT
Start: 2018-10-02 | End: 2018-10-05

## 2018-10-02 RX ORDER — KETOROLAC TROMETHAMINE 30 MG/ML
15 SYRINGE (ML) INJECTION EVERY 8 HOURS
Qty: 0 | Refills: 0 | Status: DISCONTINUED | OUTPATIENT
Start: 2018-10-02 | End: 2018-10-05

## 2018-10-02 RX ORDER — OXYCODONE AND ACETAMINOPHEN 5; 325 MG/1; MG/1
1 TABLET ORAL
Qty: 0 | Refills: 0 | Status: DISCONTINUED | OUTPATIENT
Start: 2018-10-02 | End: 2018-10-05

## 2018-10-02 RX ORDER — ACETAMINOPHEN 500 MG
1000 TABLET ORAL ONCE
Qty: 0 | Refills: 0 | Status: COMPLETED | OUTPATIENT
Start: 2018-10-02 | End: 2018-10-02

## 2018-10-02 RX ORDER — METOCLOPRAMIDE HCL 10 MG
10 TABLET ORAL ONCE
Qty: 0 | Refills: 0 | Status: DISCONTINUED | OUTPATIENT
Start: 2018-10-02 | End: 2018-10-02

## 2018-10-02 RX ORDER — NALOXONE HYDROCHLORIDE 4 MG/.1ML
0.1 SPRAY NASAL
Qty: 0 | Refills: 0 | Status: DISCONTINUED | OUTPATIENT
Start: 2018-10-02 | End: 2018-10-05

## 2018-10-02 RX ORDER — FERROUS SULFATE 325(65) MG
325 TABLET ORAL DAILY
Qty: 0 | Refills: 0 | Status: DISCONTINUED | OUTPATIENT
Start: 2018-10-02 | End: 2018-10-05

## 2018-10-02 RX ADMIN — Medication 400 MILLIGRAM(S): at 17:15

## 2018-10-02 RX ADMIN — SODIUM CHLORIDE 2000 MILLILITER(S): 9 INJECTION, SOLUTION INTRAVENOUS at 10:00

## 2018-10-02 RX ADMIN — Medication 125 MILLIUNIT(S)/MIN: at 15:41

## 2018-10-02 RX ADMIN — ONDANSETRON 4 MILLIGRAM(S): 8 TABLET, FILM COATED ORAL at 15:53

## 2018-10-02 RX ADMIN — Medication 100 MILLIGRAM(S): at 13:33

## 2018-10-02 RX ADMIN — Medication 30 MILLILITER(S): at 13:33

## 2018-10-02 RX ADMIN — Medication 200 MILLIGRAM(S): at 17:41

## 2018-10-02 RX ADMIN — SODIUM CHLORIDE 125 MILLILITER(S): 9 INJECTION, SOLUTION INTRAVENOUS at 12:36

## 2018-10-02 RX ADMIN — Medication 1000 MILLIGRAM(S): at 17:42

## 2018-10-02 RX ADMIN — SCOPALAMINE 1 PATCH: 1 PATCH, EXTENDED RELEASE TRANSDERMAL at 13:00

## 2018-10-03 LAB
BASOPHILS NFR BLD AUTO: 0.1 % — SIGNIFICANT CHANGE UP (ref 0–2)
EOSINOPHIL NFR BLD AUTO: 0.1 % — SIGNIFICANT CHANGE UP (ref 0–6)
HCT VFR BLD CALC: 32.7 % — LOW (ref 34.5–45)
HGB BLD-MCNC: 10.3 G/DL — LOW (ref 11.5–15.5)
LYMPHOCYTES # BLD AUTO: 9.5 % — LOW (ref 13–44)
MCHC RBC-ENTMCNC: 29.2 PG — SIGNIFICANT CHANGE UP (ref 27–34)
MCHC RBC-ENTMCNC: 31.5 G/DL — LOW (ref 32–36)
MCV RBC AUTO: 92.6 FL — SIGNIFICANT CHANGE UP (ref 80–100)
MONOCYTES NFR BLD AUTO: 6.7 % — SIGNIFICANT CHANGE UP (ref 2–14)
NEUTROPHILS NFR BLD AUTO: 83.6 % — HIGH (ref 43–77)
PLATELET # BLD AUTO: 242 K/UL — SIGNIFICANT CHANGE UP (ref 150–400)
RBC # BLD: 3.53 M/UL — LOW (ref 3.8–5.2)
RBC # FLD: 15.1 % — SIGNIFICANT CHANGE UP (ref 10.3–16.9)
T PALLIDUM AB TITR SER: NEGATIVE — SIGNIFICANT CHANGE UP
WBC # BLD: 14.9 K/UL — HIGH (ref 3.8–10.5)
WBC # FLD AUTO: 14.9 K/UL — HIGH (ref 3.8–10.5)

## 2018-10-03 RX ADMIN — Medication 600 MILLIGRAM(S): at 05:45

## 2018-10-03 RX ADMIN — Medication 600 MILLIGRAM(S): at 17:18

## 2018-10-03 RX ADMIN — Medication 25 MILLIGRAM(S): at 19:51

## 2018-10-03 RX ADMIN — Medication 600 MILLIGRAM(S): at 18:18

## 2018-10-03 RX ADMIN — Medication 600 MILLIGRAM(S): at 11:36

## 2018-10-03 RX ADMIN — Medication 1 TABLET(S): at 11:36

## 2018-10-03 RX ADMIN — Medication 600 MILLIGRAM(S): at 06:15

## 2018-10-03 RX ADMIN — Medication 600 MILLIGRAM(S): at 23:17

## 2018-10-03 RX ADMIN — Medication 650 MILLIGRAM(S): at 20:41

## 2018-10-03 RX ADMIN — HEPARIN SODIUM 5000 UNIT(S): 5000 INJECTION INTRAVENOUS; SUBCUTANEOUS at 05:45

## 2018-10-03 RX ADMIN — Medication 600 MILLIGRAM(S): at 12:30

## 2018-10-03 RX ADMIN — Medication 100 MILLIGRAM(S): at 23:17

## 2018-10-03 RX ADMIN — Medication 100 MILLIGRAM(S): at 11:36

## 2018-10-03 RX ADMIN — Medication 75 MICROGRAM(S): at 06:36

## 2018-10-03 RX ADMIN — Medication 325 MILLIGRAM(S): at 11:36

## 2018-10-03 RX ADMIN — HEPARIN SODIUM 5000 UNIT(S): 5000 INJECTION INTRAVENOUS; SUBCUTANEOUS at 17:18

## 2018-10-03 NOTE — PROGRESS NOTE ADULT - ASSESSMENT
A/P   43y  s/p  section, POD #1, stable  -  Pain: PO motrin, percocets for severe pain PRN  -  Post-operatively labs: post-op Hgb pending , hemodynamically stable, no symptoms of anemia   -  GI: tolerating clears, passing gas, ADAT  -  : poe in situ; DC this AM, f/u TOV  -  DVT prophylaxis: ambulation, SCDs, SQH  -  Dispo: POD 3 or 4

## 2018-10-03 NOTE — PROGRESS NOTE ADULT - SUBJECTIVE AND OBJECTIVE BOX
Patient evaluated at bedside this morning, resting comfortable in bed, with no acute events overnight.  She reports pain is well controlled.  She denies headache, dizziness, chest pain, palpitations, shortness of breathe, nausea, vomiting or heavy vaginal bleeding.  She has tried ambulating this morning, passed flatus. Poe removed. Tolerating clear liquids.     Physical Exam:  Vital Signs Last 24 Hrs  T(C): 36.7 (03 Oct 2018 06:18), Max: 36.7 (02 Oct 2018 22:43)  T(F): 98 (03 Oct 2018 06:18), Max: 98 (02 Oct 2018 22:43)  HR: 63 (03 Oct 2018 06:18) (61 - 79)  BP: 94/65 (03 Oct 2018 06:18) (91/58 - 127/53)  BP(mean): --  RR: 18 (03 Oct 2018 06:18) (15 - 18)  SpO2: 98% (03 Oct 2018 06:18) (96% - 98%)    GA: NAD, A+0 x 3  CV: RRR, no murmurs/rubs  Pulm: CTAB, no wheezes/rhonchi  Breasts: soft, nontender, no palpable masses  Abd: + BS, soft, nontender, nondistended, no rebound or guarding, prevena over incision clean, dry and intact, uterus firm at midline,  2 fb below umbilicus  : poe in situ, lochia WNL  Extremities: no swelling or calf tenderness, reflexes +2 bilaterally, SCD in place                            11.1   9.8   )-----------( 259      ( 02 Oct 2018 10:35 )             35.0               acetaminophen   Tablet .. 650 milliGRAM(s) Oral every 6 hours PRN  diphenhydrAMINE 25 milliGRAM(s) Oral every 6 hours PRN  diphtheria/tetanus/pertussis (acellular) Vaccine (ADAcel) 0.5 milliLiter(s) IntraMuscular once  docusate sodium 100 milliGRAM(s) Oral two times a day PRN  ferrous    sulfate 325 milliGRAM(s) Oral daily  glycerin Suppository - Adult 1 Suppository(s) Rectal at bedtime PRN  heparin  Injectable 5000 Unit(s) SubCutaneous every 12 hours  HYDROmorphone  Injectable 0.5 milliGRAM(s) IV Push every 3 hours PRN  ibuprofen  Tablet. 600 milliGRAM(s) Oral every 6 hours  ketorolac   Injectable 15 milliGRAM(s) IV Push every 8 hours PRN  ketorolac   Injectable 15 milliGRAM(s) IV Push every 6 hours PRN  lactated ringers. 1000 milliLiter(s) IV Continuous <Continuous>  lanolin Ointment 1 Application(s) Topical every 3 hours PRN  levothyroxine 75 MICROGram(s) Oral daily  morphine PF Spinal 0.3 milliGRAM(s) IntraThecal. once  naloxone Injectable 0.1 milliGRAM(s) IV Push every 3 minutes PRN  ondansetron Injectable 4 milliGRAM(s) IV Push every 6 hours PRN  oxyCODONE    5 mG/acetaminophen 325 mG 1 Tablet(s) Oral every 3 hours PRN  oxyCODONE    5 mG/acetaminophen 325 mG 2 Tablet(s) Oral every 6 hours PRN  oxytocin Infusion 41.667 milliUNIT(s)/Min IV Continuous <Continuous>  prenatal multivitamin 1 Tablet(s) Oral daily  simethicone 80 milliGRAM(s) Chew every 4 hours PRN

## 2018-10-04 RX ADMIN — HEPARIN SODIUM 5000 UNIT(S): 5000 INJECTION INTRAVENOUS; SUBCUTANEOUS at 05:42

## 2018-10-04 RX ADMIN — Medication 600 MILLIGRAM(S): at 05:42

## 2018-10-04 RX ADMIN — Medication 100 MILLIGRAM(S): at 23:48

## 2018-10-04 RX ADMIN — Medication 650 MILLIGRAM(S): at 16:41

## 2018-10-04 RX ADMIN — Medication 650 MILLIGRAM(S): at 07:58

## 2018-10-04 RX ADMIN — Medication 600 MILLIGRAM(S): at 12:05

## 2018-10-04 RX ADMIN — Medication 650 MILLIGRAM(S): at 02:16

## 2018-10-04 RX ADMIN — Medication 1 TABLET(S): at 12:04

## 2018-10-04 RX ADMIN — Medication 600 MILLIGRAM(S): at 18:07

## 2018-10-04 RX ADMIN — Medication 100 MILLIGRAM(S): at 13:38

## 2018-10-04 RX ADMIN — Medication 650 MILLIGRAM(S): at 03:15

## 2018-10-04 RX ADMIN — Medication 325 MILLIGRAM(S): at 12:04

## 2018-10-04 RX ADMIN — Medication 75 MICROGRAM(S): at 05:42

## 2018-10-04 RX ADMIN — Medication 650 MILLIGRAM(S): at 21:49

## 2018-10-04 RX ADMIN — HEPARIN SODIUM 5000 UNIT(S): 5000 INJECTION INTRAVENOUS; SUBCUTANEOUS at 18:07

## 2018-10-04 RX ADMIN — Medication 600 MILLIGRAM(S): at 12:40

## 2018-10-04 RX ADMIN — Medication 600 MILLIGRAM(S): at 23:48

## 2018-10-04 RX ADMIN — Medication 650 MILLIGRAM(S): at 15:45

## 2018-10-04 NOTE — PROGRESS NOTE ADULT - SUBJECTIVE AND OBJECTIVE BOX
Patient evaluated at bedside this morning, resting comfortable in bed.   She reports pain is well controlled with motrin.  She denies headache, dizziness, chest pain, palpitations, shortness of breathe, nausea, vomiting or heavy vaginal bleeding.  She has been ambulating without assistance, voiding spontaneously, passing gas, tolerating regular diet and is breastfeeding.    Physical Exam:  Vital Signs Last 24 Hrs  T(C): 36.6 (04 Oct 2018 06:09), Max: 36.6 (04 Oct 2018 06:09)  T(F): 97.8 (04 Oct 2018 06:09), Max: 97.8 (04 Oct 2018 06:09)  HR: 74 (04 Oct 2018 06:09) (62 - 79)  BP: 92/60 (04 Oct 2018 06:09) (84/50 - 93/53)  BP(mean): --  RR: 18 (04 Oct 2018 06:09) (16 - 18)  SpO2: 96% (04 Oct 2018 06:09) (96% - 100%)    GA: NAD, A+0 x 3  CV: RRR  Pulm: CTAB  Breasts: soft, nontender, no palpable masses  Abd: + BS, soft, nontender, nondistended, no rebound or guarding, prevena over incision clean, dry and intact, uterus firm at midline,  2 fb below umbilicus  : lochia WNL  Extremities: no swelling or calf tenderness                             10.3   14.9  )-----------( 242      ( 03 Oct 2018 08:08 )             32.7

## 2018-10-04 NOTE — PROGRESS NOTE ADULT - ASSESSMENT
A/P: 43y s/p  section, POD#2, stable  -  Pain: PO motrin q6hrs, percocets for severe pain PRN  -  Post-operatively labs: post-op Hgb 10.3 , hemodynamically stable, no symptoms of anemia   -  GI: tolerating regular diet, passing gas, colace PRN  -  : s/p poe , urinating without difficulty  -  DVT prophylaxis: encouraged increased ambulation, SCDs, SQH  -  Dispo: POD 3 or 4, prevena removed prior to discharge

## 2018-10-05 ENCOUNTER — TRANSCRIPTION ENCOUNTER (OUTPATIENT)
Age: 43
End: 2018-10-05

## 2018-10-05 VITALS
DIASTOLIC BLOOD PRESSURE: 56 MMHG | HEART RATE: 63 BPM | RESPIRATION RATE: 18 BRPM | OXYGEN SATURATION: 97 % | TEMPERATURE: 97 F | SYSTOLIC BLOOD PRESSURE: 101 MMHG

## 2018-10-05 PROCEDURE — 36415 COLL VENOUS BLD VENIPUNCTURE: CPT

## 2018-10-05 PROCEDURE — 86850 RBC ANTIBODY SCREEN: CPT

## 2018-10-05 PROCEDURE — 86901 BLOOD TYPING SEROLOGIC RH(D): CPT

## 2018-10-05 PROCEDURE — 85025 COMPLETE CBC W/AUTO DIFF WBC: CPT

## 2018-10-05 PROCEDURE — 85027 COMPLETE CBC AUTOMATED: CPT

## 2018-10-05 PROCEDURE — 86900 BLOOD TYPING SEROLOGIC ABO: CPT

## 2018-10-05 PROCEDURE — 86780 TREPONEMA PALLIDUM: CPT

## 2018-10-05 RX ORDER — PREGABALIN 225 MG/1
0 CAPSULE ORAL
Qty: 0 | Refills: 0 | COMMUNITY

## 2018-10-05 RX ORDER — IBUPROFEN 200 MG
1 TABLET ORAL
Qty: 0 | Refills: 0 | COMMUNITY
Start: 2018-10-05

## 2018-10-05 RX ORDER — BENZOYL PEROXIDE MICRONIZED 5.8 %
1 TOWELETTE (EA) TOPICAL
Qty: 0 | Refills: 0 | COMMUNITY

## 2018-10-05 RX ADMIN — Medication 75 MICROGRAM(S): at 06:54

## 2018-10-05 RX ADMIN — HEPARIN SODIUM 5000 UNIT(S): 5000 INJECTION INTRAVENOUS; SUBCUTANEOUS at 06:55

## 2018-10-05 RX ADMIN — Medication 600 MILLIGRAM(S): at 03:26

## 2018-10-05 RX ADMIN — Medication 650 MILLIGRAM(S): at 03:26

## 2018-10-05 RX ADMIN — Medication 650 MILLIGRAM(S): at 03:27

## 2018-10-05 RX ADMIN — OXYCODONE AND ACETAMINOPHEN 1 TABLET(S): 5; 325 TABLET ORAL at 03:29

## 2018-10-05 RX ADMIN — OXYCODONE AND ACETAMINOPHEN 1 TABLET(S): 5; 325 TABLET ORAL at 04:27

## 2018-10-05 RX ADMIN — Medication 600 MILLIGRAM(S): at 06:54

## 2018-10-05 NOTE — PROGRESS NOTE ADULT - SUBJECTIVE AND OBJECTIVE BOX
Patient evaluated at bedside this morning, resting comfortable in bed.   She reports pain is well controlled with motrin and percocets.   She denies headache, dizziness, chest pain, palpitations, shortness of breathe, nausea, vomiting or heavy vaginal bleeding.  She has been ambulating without assistance, voiding spontaneously, passing gas, tolerating regular diet and is breastfeeding.    Physical Exam:  Vital Signs Last 24 Hrs  T(C): 36.1 (05 Oct 2018 06:25), Max: 36.1 (04 Oct 2018 18:28)  T(F): 96.9 (05 Oct 2018 06:25), Max: 97 (04 Oct 2018 18:28)  HR: 63 (05 Oct 2018 06:25) (63 - 75)  BP: 101/56 (05 Oct 2018 06:25) (97/55 - 109/64)  BP(mean): --  RR: 18 (05 Oct 2018 06:25) (17 - 18)  SpO2: 97% (05 Oct 2018 06:25) (97% - 99%)    GA: NAD, A+0 x 3  CV: RRR  Pulm: CTAB  Breasts: soft, nontender, no palpable masses  Abd: + BS, soft, nontender, nondistended, no rebound or guarding, prevena over incision clean, dry and intact, uterus firm at midline,  2 fb below umbilicus  : lochia WNL  Extremities: no swelling or calf tenderness                             10.3   14.9  )-----------( 242      ( 03 Oct 2018 08:08 )             32.7

## 2018-10-05 NOTE — DISCHARGE NOTE OB - PATIENT PORTAL LINK FT
You can access the [facility] Patient Portal, offered by [facility], by registering with the following website: [email or web address]

## 2018-10-05 NOTE — DISCHARGE NOTE OB - PLAN OF CARE
postpartum care 43y female s/p  section, post operative day 3, meeting all postpartum milestones. No heavy lifting. Pelvic rest until cleared. Follow-up with obstetrician in 1-2 weeks.

## 2018-10-05 NOTE — PROGRESS NOTE ADULT - ASSESSMENT
A/P: 43y s/p  section, POD#3, stable  -  Pain: PO motrin q6hrs, percocets for severe pain PRN  -  Post-operatively labs: post-op Hgb 10.3 , hemodynamically stable, no symptoms of anemia   -  GI: tolerating regular diet, passing gas, colace PRN  -  : s/p poe , urinating without difficulty  -  DVT prophylaxis: encouraged increased ambulation, SCDs, SQH  -  Dispo: POD 3 , prevena to be removed day 7

## 2018-10-05 NOTE — DISCHARGE NOTE OB - CARE PROVIDER_API CALL
Chris Peña), Obstetrics and Gynecology  [address] [address]  Phone: [phone]  Fax: [phone]

## 2018-10-05 NOTE — DISCHARGE NOTE OB - MEDICATION SUMMARY - MEDICATIONS TO TAKE
I will START or STAY ON the medications listed below when I get home from the hospital:    ibuprofen 600 mg oral tablet  -- 1 tab(s) by mouth every 6 hours  -- Indication: For as needed for pain    Prenatal 1 oral capsule  -- Indication: For Postpartum state    Synthroid 175 mcg (0.175 mg) oral tablet  -- 1 tab(s) by mouth once a day  -- Indication: For hypothyroidism

## 2018-10-05 NOTE — DISCHARGE NOTE OB - CARE PLAN
Principal Discharge DX:	Postpartum state  Goal:	postpartum care  Assessment and plan of treatment:	43y female s/p  section, post operative day 3, meeting all postpartum milestones. No heavy lifting. Pelvic rest until cleared. Follow-up with obstetrician in 1-2 weeks.

## 2018-10-09 DIAGNOSIS — D50.8 OTHER IRON DEFICIENCY ANEMIAS: ICD-10-CM

## 2018-10-09 DIAGNOSIS — Z34.83 ENCOUNTER FOR SUPERVISION OF OTHER NORMAL PREGNANCY, THIRD TRIMESTER: ICD-10-CM

## 2018-10-09 DIAGNOSIS — E53.8 DEFICIENCY OF OTHER SPECIFIED B GROUP VITAMINS: ICD-10-CM

## 2018-10-09 DIAGNOSIS — E03.9 HYPOTHYROIDISM, UNSPECIFIED: ICD-10-CM

## 2018-10-09 DIAGNOSIS — Z98.890 OTHER SPECIFIED POSTPROCEDURAL STATES: ICD-10-CM

## 2018-10-09 DIAGNOSIS — Z3A.39 39 WEEKS GESTATION OF PREGNANCY: ICD-10-CM

## 2018-10-09 DIAGNOSIS — O34.211 MATERNAL CARE FOR LOW TRANSVERSE SCAR FROM PREVIOUS CESAREAN DELIVERY: ICD-10-CM

## 2023-04-11 NOTE — PATIENT PROFILE OB - [email or web address]
